# Patient Record
(demographics unavailable — no encounter records)

---
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
## 2024-10-28 NOTE — PHYSICAL EXAM
[Acanthosis Nigricans] : no acanthosis nigricans [Delayed in the Right Toes] : normal in the toes [Delayed in the Left Toes] : normal in the toes [de-identified] : central obesity
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
## 2024-10-28 NOTE — PHYSICAL EXAM
[Acanthosis Nigricans] : no acanthosis nigricans [Delayed in the Right Toes] : normal in the toes [Delayed in the Left Toes] : normal in the toes [de-identified] : central obesity

## 2025-05-13 NOTE — ASSESSMENT
[FreeTextEntry1] : 558F, obese w/ 2022 dx lupus, T2DM cb b/l NPDR with macular edema/neuropathy/CVA, HLD, and HTN here for follow up rtc in 3months with me with labs  must come in every 3-4 months for supplies kailey in 2 weeks labs pending  T2DM - no data AGAIN, goal A1c<7 based on age.  continue Kailey up to date with eye doctor but has macular edema.  ROTATE sites! continue glargline 52 units (do not vary dose) continue novolog 14-16 with scale 1:50 above 150 continue farxiga 10 increase to mounjaro 7.5mg weekly 1mg dst normal richi improved past meds trulicity caused bad nausea/diarrhea does not want insulin pump yet but will think about it  HLD- ldl goal<70. defer to cardiology. sees cardiologist from [facility].   HTN- BP acceptable. continue ACEi  Vitamin D deficiency- continue weekly vitamin d   --- This patient with diabetes - Is injecting insulin 3 or more times per day - Is currently on CGM, testing glucose continuously - Has been seen in the office by a provider within the last six months to review CGM data with their provider - Is adjusting multi-dose insulin daily using a sliding scale or correction factor as documented in the medical record CGM is medically necessary for this pt. - CGM will improve/maintain A1c - CGM will reduce hypoglycemic events Kailey and Dexcom each require one test strip daily to use in case of sensor failure or for blood glucose verification or during sensor warmup.

## 2025-05-13 NOTE — PHYSICAL EXAM
[Alert] : alert [Well Nourished] : well nourished [No Acute Distress] : no acute distress [Well Developed] : well developed [Normal Sclera/Conjunctiva] : normal sclera/conjunctiva [EOMI] : extra ocular movement intact [No Proptosis] : no proptosis [Normal Oropharynx] : the oropharynx was normal [Thyroid Not Enlarged] : the thyroid was not enlarged [No Thyroid Nodules] : no palpable thyroid nodules [Well Healed Scar] : well healed scar [No Respiratory Distress] : no respiratory distress [No Accessory Muscle Use] : no accessory muscle use [Clear to Auscultation] : lungs were clear to auscultation bilaterally [Normal S1, S2] : normal S1 and S2 [Normal Rate] : heart rate was normal [Regular Rhythm] : with a regular rhythm [Normal Bowel Sounds] : normal bowel sounds [Not Tender] : non-tender [Not Distended] : not distended [Soft] : abdomen soft [Normal Reflexes] : deep tendon reflexes were 2+ and symmetric [No Tremors] : no tremors [Oriented x3] : oriented to person, place, and time [Normal Affect] : the affect was normal [Normal Mood] : the mood was normal [Acanthosis Nigricans] : no acanthosis nigricans [Delayed in the Right Toes] : normal in the toes [Delayed in the Left Toes] : normal in the toes [de-identified] : central obesity
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
